# Patient Record
Sex: MALE | Race: WHITE | NOT HISPANIC OR LATINO | Employment: OTHER | URBAN - METROPOLITAN AREA
[De-identification: names, ages, dates, MRNs, and addresses within clinical notes are randomized per-mention and may not be internally consistent; named-entity substitution may affect disease eponyms.]

---

## 2021-11-15 ENCOUNTER — TELEPHONE (OUTPATIENT)
Dept: OTOLARYNGOLOGY | Facility: CLINIC | Age: 66
End: 2021-11-15
Payer: COMMERCIAL

## 2021-11-15 NOTE — TELEPHONE ENCOUNTER
"Unable to LVM. Sent letter w/relevant information schedule patient.    SCHEDULING INSTRUCTIONS: Please schedule patient for P NEW appointment in [facility] ENT department. Please schedule wany ONE of the following proivders:  Sterling Gonzalez Khariwala OR [facility]. Please include \"angioma of base of the tongue, referred by Dr. Rodriguez\" in appointment notes.  "

## 2021-11-17 ENCOUNTER — TRANSCRIBE ORDERS (OUTPATIENT)
Dept: OTHER | Age: 66
End: 2021-11-17
Payer: COMMERCIAL

## 2021-11-17 DIAGNOSIS — K14.8 MASS OF TONGUE: Primary | ICD-10-CM

## 2021-11-19 NOTE — TELEPHONE ENCOUNTER
FUTURE VISIT INFORMATION      FUTURE VISIT INFORMATION:    Date: 11/29/21    Time: 10:20 AM    [place] [facility]  REFERRAL INFORMATION:    Referring provider: Dr. Jacky Rodriguez    Referring providers clinic: [facility]    Reason for visit/diagnosis: Angioma of Base of Tongue    RECORDS REQUESTED FROM:       Clinic name Comments Records Status Imaging Status   [facility] 11/12/21 - ENT OV with Dr. Rodriguez  11/1/21 - PCC OV with Dr. Doherty Care Everywhere    [facility] - imaging 6/28/19 - XR Esophagus  6/28/19 - XR Video Swallow [facility] [place] 11/26 Sent for upload                       * 11/19/21 10:13 AM Faxed req to [facility] for images. - [place]  * 11/26/21 12:33 PM Imaging disc received from [facility] and sent to urgently be uploaded into PACs. - [place]

## 2021-11-27 NOTE — PROGRESS NOTES
Dear Dr. Rodriguez:    I had the pleasure of meeting Pato Quiñones in consultation today at the [facility] at your request.     History of Present Illness:   Pato Quiñones is a 66 year old man referred for evaluation of a tongue hemangioma. The patient saw Dr Rodriguez in [place] on 11/12/2021 for evaluation of a vascular mass under the tongue. The mass has been there for more than 5 years but has increased in size. He says it started the size of a pencil eraser. It gradually increased in size. He thinks that after the COVID vaccine it seemed to increase. He also notes that he has been diagnosed with COPD and is coughing more, unsure if this contributed to the change in size. He feels like it is hard under the tongue. It does not interfere with his drinking. He takes his lower denture out when he eats as he worries he will bite the lesion. He has no pain. He has no bleeding.          Past medical history: chronic neck pain, chronic back pain, sciatica, depression, anxiety, emphysema, hyperlipidemia    Past surgical history: right carpal tunnel surgery, cervical spine surgery, knee arthroscopy x 3, tendon transplant, appendectomy, shoulder arthroscopy    Social history: former smoker 1/2 ppd x 35 years, quit in 2018. Chewing tobacco 20-30 year ago. No alcohol for 20 years. Live with wife. Disabled since 2008.    Family history: sister had tonsil cancer, dad had laryngeal cancer    MEDICATIONS:     Current Outpatient Medications   Medication Sig Dispense Refill     albuterol (PROAIR HFA/PROVENTIL HFA/VENTOLIN HFA) 108 (90 Base) MCG/ACT inhaler Inhale 2 puffs into the lungs       atorvastatin (LIPITOR) 40 MG tablet Take 40 mg by mouth daily       buPROPion (WELLBUTRIN SR) 150 MG 12 hr tablet Take 1 tablet by mouth 2 times daily       busPIRone (BUSPAR) 7.5 MG tablet TAKE THREE TABLETS BY MOUTH TWICE A DAY       cyclobenzaprine (FLEXERIL) 10 MG tablet TAKE ONE TABLET BY MOUTH  EVERY 6 HOURS AS NEEDED FOR MUSCLE SPASMS       diazepam (VALIUM) 5 MG tablet TAKE ONE TABLET BY MOUTH THREE TIMES A DAY AS NEEDED       ketoconazole (NIZORAL) 2 % external shampoo LATHER ON DAMP SCALP,LEAVE ON FOR 5 MIN,THEN RINSE WITH WATER       omeprazole (PRILOSEC) 20 MG DR capsule Take 20 mg by mouth       tamsulosin (FLOMAX) 0.4 MG capsule TAKE ONE CAPSULE BY MOUTH ONCE DAILY AFTER A MEAL       TRELEGY ELLIPTA 100-62.5-25 MCG/INH oral inhaler INHALE ONE PUFF BY MOUTH ONCE DAILY         ALLERGIES:    Allergies   Allergen Reactions     Duloxetine Muscle Pain (Myalgia)     Grapefruit Extract      Upset GI     Ibuprofen Nausea     Nsaids      Upset GI     Vilazodone      Other reaction(s): Confusion     Aspirin Nausea       HABITS/SOCIAL HISTORY:   former smoker 1/2 ppd x 35 years, quit in 2018. Chewing tobacco 20-30 year ago. No alcohol for 20 years.   Live with wife.   Disabled since 2008.    Social History     Socioeconomic History     Marital status:      Spouse name: Not on file     Number of children: Not on file     Years of education: Not on file     Highest education level: Not on file   Occupational History     Not on file   Tobacco Use     Smoking status: Former Smoker     Packs/day: 0.50     Years: 35.00     Pack years: 17.50     Smokeless tobacco: Former User     Tobacco comment: quit smoking and chewing   4 years ago   Substance and Sexual Activity     Alcohol use: Not Currently     Drug use: Not on file     Sexual activity: Not on file   Other Topics Concern     Not on file   Social History Narrative     Not on file     Social Determinants of Health     Financial Resource Strain: Not on file   Food Insecurity: Not on file   Transportation Needs: Not on file   Physical Activity: Not on file   Stress: Not on file   Social Connections: Not on file   Intimate Partner Violence: Not on file   Housing Stability: Not on file       PAST MEDICAL HISTORY: No past medical history on file.     PAST  "SURGICAL HISTORY: No past surgical history on file.    FAMILY HISTORY:  No family history on file.    REVIEW OF SYSTEMS:  12 point ROS was negative other than the symptoms noted above in the HPI.  Patient Supplied Answers to Review of Systems  [facility] ROS 11/29/2021   Constitutional Problems with sleep   Neurology Headache   Ears, Nose, Throat Ringing/noise in ears, Nasal congestion or drainage, Trouble swallowing, Hoarseness   Cardiopulmonary Cough, Breathing problems   Endocrine Thirst, Frequent urination         PHYSICAL EXAMINATION:   Pulse 87   Temp 98.1  F (36.7  C)   Ht 1.727 m (5' 8\")   Wt 99.8 kg (220 lb)   SpO2 97%   BMI 33.45 kg/m     Appearance:   normal; NAD, age-appropriate appearance, well-developed, normal habitus   Communication:   normal; communicates verbally, normal voice quality   Head/Face:   inspection -  Normal; no scars or visible lesions   Salivary glands -  Normal size, no tenderness, swelling, or palpable masses   Facial strength -  Normal and symmetric    Skin:  normal, no rash   Ears:  auricle (AD) -  normal  EAC (AD) -  normal  TM (AD) -  Normal, no effusion  auricle (AS) -  normal  EAC (AS) -  normal  TM (AS) -  Normal, no effusion  Normal clinical speech reception   Nose:  Ext. inspection -  Normal  Internal Inspection -  Normal mucosa, septum, and turbinates   Nasopharynx:  normal mucosa, no masses   Oral Cavity:  lips -  Normal mucosa, oral competence, and stoma size  Edentulous along mandible, partial upper plate  Hard palate and buccal mucosa normal  Along the right ventrolateral tongue/right FOM there is a prominent/dilated vein present about 1 cm in size, soft, rest of tongue and FOM without palpable masses or mucosal lesions, normal tongue movement   Oropharynx:  mucosa -  Normal, no lesions  soft palate -  Normal, no lesions, no asymmetry, normal elevation  tonsils -  Normal, no exudates, no abnormal lesions, symmetric  Base of tongue - normal  Vallecula - clear "   Hypopharynx:  Normal pyriform sinus and pharyngeal wall mucosa   No pooled secretions    Larynx:  Epiglottis, AE folds, false vocal cords, true vocal cords, arytenoids normal in appearance, bilaterally mobile cords    Neck: No visible mass or asymmetry    Lymphatic:  no abnormal nodes   Cardiovascular:  warm, pink, well-perfused extremities without swelling, tenderness, or edema   Respiratory:  Normal respiratory effort, no stridor   Neuro/Psych.:  mood/affect -  normal  mental status -  normal       PROCEDURES:   Flexible fiberoptic laryngoscopy: Scope exam was indicated due to tongue lesion. Verbal consent was obtained. The nasal cavity was prepped with an aerosolized solution of topical anesthetic and vasoconstrictive agent. The scope was passed through the anterior nasal cavity and advanced. Inspection of the nasopharynx revealed no gross abnormality. The base of tongue and vallecula are normal. The epiglottis, AE folds, false cords, true cords, arytenoids are normal.  Inspection of the larynx revealed bilaterally mobile vocal cords. Pyriform sinuses are symmetric. The airway is patent. Procedure tolerated well with no immediate complications noted.          RESULTS REVIEWED:   I reviewed the notes from Dr Rodriguez and Dr Doherty      IMPRESSION AND PLAN:   Pato Quiñones is a 66 year old man with a lesion of the tongue/floor of mouth. I think this is a dilated vein along the floor of the mouth. It could certainly also be a small localized venous malformation but it appears to be quite limited. He is having no symptoms from it. I discussed that we could consider a referral to IR to see about sclerotherapy to it. At this time since it is not causing him problems or symptoms he does not feel this is necessary. He can let us know if it changes/increases in size or he wishes to have a consult placed.    Thank you very much for the opportunity to participate in the care of your patient.      Rekha Katz MD,  M.D.  Otolaryngology- Head & Neck Surgery      This note was dictated with voice recognition software and then edited. Please excuse any unintentional errors.           CC:  Jacky Rodriguez MD  [address]  [place] [id number]

## 2021-11-29 ENCOUNTER — OFFICE VISIT (OUTPATIENT)
Dept: OTOLARYNGOLOGY | Facility: CLINIC | Age: 66
End: 2021-11-29
Attending: OTOLARYNGOLOGY
Payer: COMMERCIAL

## 2021-11-29 ENCOUNTER — PRE VISIT (OUTPATIENT)
Dept: OTOLARYNGOLOGY | Facility: CLINIC | Age: 66
End: 2021-11-29

## 2021-11-29 VITALS
OXYGEN SATURATION: 97 % | BODY MASS INDEX: 33.34 KG/M2 | HEIGHT: 68 IN | HEART RATE: 87 BPM | WEIGHT: 220 LBS | TEMPERATURE: 98.1 F

## 2021-11-29 DIAGNOSIS — D18.09 HEMANGIOMA OF TONGUE: Primary | ICD-10-CM

## 2021-11-29 PROCEDURE — 31575 DIAGNOSTIC LARYNGOSCOPY: CPT | Performed by: OTOLARYNGOLOGY

## 2021-11-29 PROCEDURE — 99203 OFFICE O/P NEW LOW 30 MIN: CPT | Mod: 25 | Performed by: OTOLARYNGOLOGY

## 2021-11-29 RX ORDER — ATORVASTATIN CALCIUM 40 MG/1
40 TABLET, FILM COATED ORAL DAILY
COMMUNITY
Start: 2021-09-02

## 2021-11-29 RX ORDER — KETOCONAZOLE 20 MG/ML
SHAMPOO TOPICAL
COMMUNITY
Start: 2021-09-09

## 2021-11-29 RX ORDER — DIAZEPAM 5 MG
TABLET ORAL
COMMUNITY
Start: 2021-11-11

## 2021-11-29 RX ORDER — ALBUTEROL SULFATE 90 UG/1
2 AEROSOL, METERED RESPIRATORY (INHALATION)
COMMUNITY
Start: 2021-09-02

## 2021-11-29 RX ORDER — BUPROPION HYDROCHLORIDE 150 MG/1
1 TABLET, EXTENDED RELEASE ORAL 2 TIMES DAILY
COMMUNITY
Start: 2021-11-26

## 2021-11-29 RX ORDER — TAMSULOSIN HYDROCHLORIDE 0.4 MG/1
CAPSULE ORAL
COMMUNITY
Start: 2021-05-19

## 2021-11-29 RX ORDER — BUSPIRONE HYDROCHLORIDE 7.5 MG/1
TABLET ORAL
COMMUNITY
Start: 2021-09-03

## 2021-11-29 RX ORDER — CYCLOBENZAPRINE HCL 10 MG
TABLET ORAL
COMMUNITY
Start: 2021-11-27

## 2021-11-29 ASSESSMENT — PAIN SCALES - GENERAL: PAINLEVEL: MODERATE PAIN (4)

## 2021-11-29 ASSESSMENT — MIFFLIN-ST. JEOR: SCORE: 1762.41

## 2021-11-29 NOTE — NURSING NOTE
"Chief Complaint   Patient presents with     Consult     angioma on base of tongue      Pulse 87, temperature 98.1  F (36.7  C), height 1.727 m (5' 8\"), weight 99.8 kg (220 lb), SpO2 97 %.    Go Mei LPN    "

## 2021-11-29 NOTE — LETTER
11/29/2021       RE: Pato Quiñones  [address] [place] [id number]     Dear Colleague,    Thank you for referring your patient, Pato Quiñones, to the [facility] [place] [facility]. Please see a copy of my visit note below.    Dear Dr. Rodriguez:    I had the pleasure of meeting Pato Quiñones in consultation today at the [facility] at your request.     History of Present Illness:   Pato Quiñones is a 66 year old man referred for evaluation of a tongue hemangioma. The patient saw Dr Rodriguez in [place] on 11/12/2021 for evaluation of a vascular mass under the tongue. The mass has been there for more than 5 years but has increased in size. He says it started the size of a pencil eraser. It gradually increased in size. He thinks that after the COVID vaccine it seemed to increase. He also notes that he has been diagnosed with COPD and is coughing more, unsure if this contributed to the change in size. He feels like it is hard under the tongue. It does not interfere with his drinking. He takes his lower denture out when he eats as he worries he will bite the lesion. He has no pain. He has no bleeding.          Past medical history: chronic neck pain, chronic back pain, sciatica, depression, anxiety, emphysema, hyperlipidemia    Past surgical history: right carpal tunnel surgery, cervical spine surgery, knee arthroscopy x 3, tendon transplant, appendectomy, shoulder arthroscopy    Social history: former smoker 1/2 ppd x 35 years, quit in 2018. Chewing tobacco 20-30 year ago. No alcohol for 20 years. Live with wife. Disabled since 2008.    Family history: sister had tonsil cancer, dad had laryngeal cancer    MEDICATIONS:     Current Outpatient Medications   Medication Sig Dispense Refill     albuterol (PROAIR HFA/PROVENTIL HFA/VENTOLIN HFA) 108 (90 Base) MCG/ACT  inhaler Inhale 2 puffs into the lungs       atorvastatin (LIPITOR) 40 MG tablet Take 40 mg by mouth daily       buPROPion (WELLBUTRIN SR) 150 MG 12 hr tablet Take 1 tablet by mouth 2 times daily       busPIRone (BUSPAR) 7.5 MG tablet TAKE THREE TABLETS BY MOUTH TWICE A DAY       cyclobenzaprine (FLEXERIL) 10 MG tablet TAKE ONE TABLET BY MOUTH EVERY 6 HOURS AS NEEDED FOR MUSCLE SPASMS       diazepam (VALIUM) 5 MG tablet TAKE ONE TABLET BY MOUTH THREE TIMES A DAY AS NEEDED       ketoconazole (NIZORAL) 2 % external shampoo LATHER ON DAMP SCALP,LEAVE ON FOR 5 MIN,THEN RINSE WITH WATER       omeprazole (PRILOSEC) 20 MG DR capsule Take 20 mg by mouth       tamsulosin (FLOMAX) 0.4 MG capsule TAKE ONE CAPSULE BY MOUTH ONCE DAILY AFTER A MEAL       TRELEGY ELLIPTA 100-62.5-25 MCG/INH oral inhaler INHALE ONE PUFF BY MOUTH ONCE DAILY         ALLERGIES:    Allergies   Allergen Reactions     Duloxetine Muscle Pain (Myalgia)     Grapefruit Extract      Upset GI     Ibuprofen Nausea     Nsaids      Upset GI     Vilazodone      Other reaction(s): Confusion     Aspirin Nausea       HABITS/SOCIAL HISTORY:   former smoker 1/2 ppd x 35 years, quit in 2018. Chewing tobacco 20-30 year ago. No alcohol for 20 years.   Live with wife.   Disabled since 2008.    Social History     Socioeconomic History     Marital status:      Spouse name: Not on file     Number of children: Not on file     Years of education: Not on file     Highest education level: Not on file   Occupational History     Not on file   Tobacco Use     Smoking status: Former Smoker     Packs/day: 0.50     Years: 35.00     Pack years: 17.50     Smokeless tobacco: Former User     Tobacco comment: quit smoking and chewing   4 years ago   Substance and Sexual Activity     Alcohol use: Not Currently     Drug use: Not on file     Sexual activity: Not on file   Other Topics Concern     Not on file   Social History Narrative     Not on file     Social Determinants of Health  "    Financial Resource Strain: Not on file   Food Insecurity: Not on file   Transportation Needs: Not on file   Physical Activity: Not on file   Stress: Not on file   Social Connections: Not on file   Intimate Partner Violence: Not on file   Housing Stability: Not on file       PAST MEDICAL HISTORY: No past medical history on file.     PAST SURGICAL HISTORY: No past surgical history on file.    FAMILY HISTORY:  No family history on file.    REVIEW OF SYSTEMS:  12 point ROS was negative other than the symptoms noted above in the HPI.  Patient Supplied Answers to Review of Systems  [facility] ROS 11/29/2021   Constitutional Problems with sleep   Neurology Headache   Ears, Nose, Throat Ringing/noise in ears, Nasal congestion or drainage, Trouble swallowing, Hoarseness   Cardiopulmonary Cough, Breathing problems   Endocrine Thirst, Frequent urination         PHYSICAL EXAMINATION:   Pulse 87   Temp 98.1  F (36.7  C)   Ht 1.727 m (5' 8\")   Wt 99.8 kg (220 lb)   SpO2 97%   BMI 33.45 kg/m     Appearance:   normal; NAD, age-appropriate appearance, well-developed, normal habitus   Communication:   normal; communicates verbally, normal voice quality   Head/Face:   inspection -  Normal; no scars or visible lesions   Salivary glands -  Normal size, no tenderness, swelling, or palpable masses   Facial strength -  Normal and symmetric    Skin:  normal, no rash   Ears:  auricle (AD) -  normal  EAC (AD) -  normal  TM (AD) -  Normal, no effusion  auricle (AS) -  normal  EAC (AS) -  normal  TM (AS) -  Normal, no effusion  Normal clinical speech reception   Nose:  Ext. inspection -  Normal  Internal Inspection -  Normal mucosa, septum, and turbinates   Nasopharynx:  normal mucosa, no masses   Oral Cavity:  lips -  Normal mucosa, oral competence, and stoma size  Edentulous along mandible, partial upper plate  Hard palate and buccal mucosa normal  Along the right ventrolateral tongue/right FOM there is a prominent/dilated vein present " about 1 cm in size, soft, rest of tongue and FOM without palpable masses or mucosal lesions, normal tongue movement   Oropharynx:  mucosa -  Normal, no lesions  soft palate -  Normal, no lesions, no asymmetry, normal elevation  tonsils -  Normal, no exudates, no abnormal lesions, symmetric  Base of tongue - normal  Vallecula - clear   Hypopharynx:  Normal pyriform sinus and pharyngeal wall mucosa   No pooled secretions    Larynx:  Epiglottis, AE folds, false vocal cords, true vocal cords, arytenoids normal in appearance, bilaterally mobile cords    Neck: No visible mass or asymmetry    Lymphatic:  no abnormal nodes   Cardiovascular:  warm, pink, well-perfused extremities without swelling, tenderness, or edema   Respiratory:  Normal respiratory effort, no stridor   Neuro/Psych.:  mood/affect -  normal  mental status -  normal       PROCEDURES:   Flexible fiberoptic laryngoscopy: Scope exam was indicated due to tongue lesion. Verbal consent was obtained. The nasal cavity was prepped with an aerosolized solution of topical anesthetic and vasoconstrictive agent. The scope was passed through the anterior nasal cavity and advanced. Inspection of the nasopharynx revealed no gross abnormality. The base of tongue and vallecula are normal. The epiglottis, AE folds, false cords, true cords, arytenoids are normal.  Inspection of the larynx revealed bilaterally mobile vocal cords. Pyriform sinuses are symmetric. The airway is patent. Procedure tolerated well with no immediate complications noted.          RESULTS REVIEWED:   I reviewed the notes from Dr Rodriguez and Dr Doherty      IMPRESSION AND PLAN:   Pato Quiñones is a 66 year old man with a lesion of the tongue/floor of mouth. I think this is a dilated vein along the floor of the mouth. It could certainly also be a small localized venous malformation but it appears to be quite limited. He is having no symptoms from it. I discussed that we could consider a referral to IR to  see about sclerotherapy to it. At this time since it is not causing him problems or symptoms he does not feel this is necessary. He can let us know if it changes/increases in size or he wishes to have a consult placed.    Thank you very much for the opportunity to participate in the care of your patient.      Rekha Katz MD, M.D.  Otolaryngology- Head & Neck Surgery      This note was dictated with voice recognition software and then edited. Please excuse any unintentional errors.    CC:  Jacky Rodriguez MD  [address]  [place] [id number]

## 2021-11-30 ASSESSMENT — PATIENT HEALTH QUESTIONNAIRE - PHQ9: SUM OF ALL RESPONSES TO PHQ QUESTIONS 1-9: 4

## 2024-07-02 ENCOUNTER — LAB REQUISITION (OUTPATIENT)
Dept: LAB | Facility: HOSPITAL | Age: 69
End: 2024-07-02

## 2024-07-02 PROCEDURE — 88342 IMHCHEM/IMCYTCHM 1ST ANTB: CPT | Mod: TC | Performed by: PATHOLOGY
